# Patient Record
Sex: MALE | Race: OTHER | HISPANIC OR LATINO | URBAN - METROPOLITAN AREA
[De-identification: names, ages, dates, MRNs, and addresses within clinical notes are randomized per-mention and may not be internally consistent; named-entity substitution may affect disease eponyms.]

---

## 2017-09-30 ENCOUNTER — EMERGENCY (EMERGENCY)
Facility: HOSPITAL | Age: 57
LOS: 1 days | Discharge: HOME | End: 2017-09-30
Attending: EMERGENCY MEDICINE
Payer: SELF-PAY

## 2017-09-30 VITALS
SYSTOLIC BLOOD PRESSURE: 165 MMHG | TEMPERATURE: 98 F | WEIGHT: 130.07 LBS | HEIGHT: 62.6 IN | OXYGEN SATURATION: 98 % | DIASTOLIC BLOOD PRESSURE: 93 MMHG | HEART RATE: 70 BPM | RESPIRATION RATE: 16 BRPM

## 2017-09-30 VITALS — WEIGHT: 154.98 LBS | HEIGHT: 60 IN

## 2017-09-30 PROCEDURE — 73140 X-RAY EXAM OF FINGER(S): CPT

## 2017-09-30 PROCEDURE — 99283 EMERGENCY DEPT VISIT LOW MDM: CPT | Mod: 25

## 2017-09-30 PROCEDURE — 12001 RPR S/N/AX/GEN/TRNK 2.5CM/<: CPT

## 2017-09-30 PROCEDURE — 73140 X-RAY EXAM OF FINGER(S): CPT | Mod: 26,RT

## 2017-09-30 PROCEDURE — T1013: CPT

## 2017-09-30 RX ORDER — IBUPROFEN 200 MG
600 TABLET ORAL ONCE
Qty: 0 | Refills: 0 | Status: COMPLETED | OUTPATIENT
Start: 2017-09-30 | End: 2017-09-30

## 2017-09-30 RX ORDER — IBUPROFEN 200 MG
1 TABLET ORAL
Qty: 15 | Refills: 0 | OUTPATIENT
Start: 2017-09-30 | End: 2017-10-05

## 2017-09-30 RX ADMIN — Medication 600 MILLIGRAM(S): at 17:47

## 2017-09-30 NOTE — ED PROCEDURE NOTE - CPROC ED POST PROC CARE GUIDE1
Instructed patient/caregiver regarding signs and symptoms of infection./Elevate the injured extremity as instructed./Verbal/written post procedure instructions were given to patient/caregiver.
Instructed patient/caregiver regarding signs and symptoms of infection./Verbal/written post procedure instructions were given to patient/caregiver./Keep the cast/splint/dressing clean and dry.

## 2017-09-30 NOTE — ED STATDOCS - ATTENDING CONTRIBUTION TO CARE
I, Julio Alexander, performed the initial face to face bedside interview with this patient regarding history of present illness, review of symptoms and relevant past medical, social and family history.  I completed an independent physical examination.  I was the initial provider who evaluated this patient. I have signed out the follow up of any pending tests (i.e. labs, radiological studies) to the ACP.  I have communicated the patient’s plan of care and disposition with the ACP.

## 2017-09-30 NOTE — ED STATDOCS - OBJECTIVE STATEMENT
56 y/o M presents to ED c/o R ring finger pain s/p injury. Wife reports pt was doing work around the house and injured his finger with a hammer. Denies N/V/D, fever, chills, SOB, CP, difficulty breathing, HA, numbness, tingling and abd pain.

## 2017-09-30 NOTE — ED PROCEDURE NOTE - ATTENDING CONTRIBUTION TO CARE
I, Julio Alexander, performed the initial face to face bedside interview with this patient regarding history of present illness, review of symptoms and relevant past medical, social and family history.  I completed an independent physical examination.  I was the initial provider who evaluated this patient. I have signed out the follow up of any pending tests (i.e. labs, radiological studies) to the ACP.  I have communicated the patient’s plan of care and disposition with the ACP.
I, Julio Alexander, performed the initial face to face bedside interview with this patient regarding history of present illness, review of symptoms and relevant past medical, social and family history.  I completed an independent physical examination.  I was the initial provider who evaluated this patient. I have signed out the follow up of any pending tests (i.e. labs, radiological studies) to the ACP.  I have communicated the patient’s plan of care and disposition with the ACP.

## 2017-09-30 NOTE — ED STATDOCS - PROGRESS NOTE DETAILS
NP NOTE:  56 y/o M hit right ring finger with hammer today.  On exam he has a 1 cm laceration proximal to nailbed, full ROM of finger and sensation intact.  Await x-ray to r/o fx, then will repair. NP NOTE:   x-ray with comminuted distal phalanx fx.   Nail was out of cuticle, medial aspect detached and was able to pop nail under cuticle.  Sutures placed all around nail to secure.  laceration on medial side of finger adjacent to nail repaired.  Splint placed.   Refer to hand sx.  Return to ED 10 days for removal of sutures.

## 2017-09-30 NOTE — ED ADULT NURSE NOTE - OBJECTIVE STATEMENT
Pt presented to ED c/o R hand 4 th finger injury after sticking it with hammer , laceration noted to the base of the nail, bleeding controlled, positive ROM

## 2017-09-30 NOTE — ED PROCEDURE NOTE - CPROC ED TIME OUT STATEMENT1
“Patient's name, , procedure and correct site were confirmed during the [place] Timeout.”
“Patient's name, , procedure and correct site were confirmed during the [place] Timeout.”

## 2017-09-30 NOTE — ED STATDOCS - MEDICAL DECISION MAKING DETAILS
56 y/o M presents to ED c/o R ring finger pain s/p injury will get x-ray r/o fracture, repair laceration and re-evaluate. 58 y/o M presents to ED c/o R ring finger pain s/p injury - x-ray with comminuted fracture. Lac repaired and finger splinted. Pt tolerted well and pain well controlled. pt discharged with hand f/u

## 2017-09-30 NOTE — ED STATDOCS - CONDUCTED A DETAILED DISCUSSION WITH PATIENT AND/OR GUARDIAN REGARDING, MDM
radiology results return to ED if symptoms worsen, persist or questions arise/need for outpatient follow-up/radiology results

## 2017-09-30 NOTE — ED STATDOCS - CARE PLAN
Principal Discharge DX:	Laceration of finger Principal Discharge DX:	Laceration of finger  Secondary Diagnosis:	Open nondisplaced fracture of distal phalanx of right ring finger, initial encounter

## 2019-08-14 NOTE — ED ADULT TRIAGE NOTE - MODE OF ARRIVAL
Walk in Zyclara Pregnancy And Lactation Text: This medication is Pregnancy Category C. It is unknown if this medication is excreted in breast milk.

## 2023-03-20 NOTE — ED PROCEDURE NOTE - NS ED PROC PERFORMED BY1 FT
Pre Op checklist complete. Pt resting in bed with call light in reach. All questions answered. Pt belongings at bedside and plan to place them in a locker. Pt  brought to bedside. Pt still needs anes consent.    Natasha Elmore NP
